# Patient Record
Sex: MALE | Race: WHITE | Employment: FULL TIME | URBAN - METROPOLITAN AREA
[De-identification: names, ages, dates, MRNs, and addresses within clinical notes are randomized per-mention and may not be internally consistent; named-entity substitution may affect disease eponyms.]

---

## 2019-04-28 ENCOUNTER — APPOINTMENT (OUTPATIENT)
Dept: CT IMAGING | Facility: CLINIC | Age: 34
End: 2019-04-28
Attending: EMERGENCY MEDICINE
Payer: COMMERCIAL

## 2019-04-28 ENCOUNTER — HOSPITAL ENCOUNTER (OUTPATIENT)
Facility: CLINIC | Age: 34
Setting detail: OBSERVATION
Discharge: HOME | End: 2019-04-29
Attending: EMERGENCY MEDICINE | Admitting: HOSPITALIST
Payer: COMMERCIAL

## 2019-04-28 DIAGNOSIS — G45.9 TIA (TRANSIENT ISCHEMIC ATTACK): ICD-10-CM

## 2019-04-28 LAB
ANION GAP SERPL CALCULATED.3IONS-SCNC: 5 MMOL/L (ref 3–14)
APTT PPP: 33 SEC (ref 22–37)
BASOPHILS # BLD AUTO: 0.1 10E9/L (ref 0–0.2)
BASOPHILS NFR BLD AUTO: 0.6 %
BUN SERPL-MCNC: 17 MG/DL (ref 7–30)
CALCIUM SERPL-MCNC: 8.5 MG/DL (ref 8.5–10.1)
CHLORIDE SERPL-SCNC: 103 MMOL/L (ref 94–109)
CHOLEST SERPL-MCNC: 157 MG/DL
CO2 SERPL-SCNC: 29 MMOL/L (ref 20–32)
CREAT SERPL-MCNC: 0.7 MG/DL (ref 0.66–1.25)
DIFFERENTIAL METHOD BLD: ABNORMAL
EOSINOPHIL # BLD AUTO: 0.2 10E9/L (ref 0–0.7)
EOSINOPHIL NFR BLD AUTO: 1.9 %
ERYTHROCYTE [DISTWIDTH] IN BLOOD BY AUTOMATED COUNT: 14 % (ref 10–15)
GFR SERPL CREATININE-BSD FRML MDRD: >90 ML/MIN/{1.73_M2}
GLUCOSE BLDC GLUCOMTR-MCNC: 177 MG/DL (ref 70–99)
GLUCOSE SERPL-MCNC: 186 MG/DL (ref 70–99)
HBA1C MFR BLD: 6.3 % (ref 0–5.6)
HCT VFR BLD AUTO: 42.4 % (ref 40–53)
HDLC SERPL-MCNC: 35 MG/DL
HGB BLD-MCNC: 13.6 G/DL (ref 13.3–17.7)
IMM GRANULOCYTES # BLD: 0.1 10E9/L (ref 0–0.4)
IMM GRANULOCYTES NFR BLD: 1 %
INR PPP: 1 (ref 0.86–1.14)
INTERPRETATION ECG - MUSE: NORMAL
LDLC SERPL CALC-MCNC: 69 MG/DL
LYMPHOCYTES # BLD AUTO: 2.3 10E9/L (ref 0.8–5.3)
LYMPHOCYTES NFR BLD AUTO: 20.2 %
MCH RBC QN AUTO: 27.2 PG (ref 26.5–33)
MCHC RBC AUTO-ENTMCNC: 32.1 G/DL (ref 31.5–36.5)
MCV RBC AUTO: 85 FL (ref 78–100)
MONOCYTES # BLD AUTO: 0.6 10E9/L (ref 0–1.3)
MONOCYTES NFR BLD AUTO: 5.4 %
NEUTROPHILS # BLD AUTO: 8 10E9/L (ref 1.6–8.3)
NEUTROPHILS NFR BLD AUTO: 70.9 %
NONHDLC SERPL-MCNC: 120 MG/DL
NRBC # BLD AUTO: 0 10*3/UL
NRBC BLD AUTO-RTO: 0 /100
PLATELET # BLD AUTO: 352 10E9/L (ref 150–450)
POTASSIUM SERPL-SCNC: 4 MMOL/L (ref 3.4–5.3)
RBC # BLD AUTO: 5 10E12/L (ref 4.4–5.9)
SODIUM SERPL-SCNC: 137 MMOL/L (ref 133–144)
TRIGL SERPL-MCNC: 257 MG/DL
TROPONIN I SERPL-MCNC: <0.015 UG/L (ref 0–0.04)
WBC # BLD AUTO: 11.3 10E9/L (ref 4–11)

## 2019-04-28 PROCEDURE — G0508 CRIT CARE TELEHEA CONSULT 60: HCPCS | Mod: G0 | Performed by: PSYCHIATRY & NEUROLOGY

## 2019-04-28 PROCEDURE — 70498 CT ANGIOGRAPHY NECK: CPT

## 2019-04-28 PROCEDURE — 25000132 ZZH RX MED GY IP 250 OP 250 PS 637: Performed by: EMERGENCY MEDICINE

## 2019-04-28 PROCEDURE — G0378 HOSPITAL OBSERVATION PER HR: HCPCS

## 2019-04-28 PROCEDURE — 99285 EMERGENCY DEPT VISIT HI MDM: CPT | Mod: 25

## 2019-04-28 PROCEDURE — 0042T CT HEAD PERFUSION WITH CONTRAST: CPT

## 2019-04-28 PROCEDURE — 00000146 ZZHCL STATISTIC GLUCOSE BY METER IP

## 2019-04-28 PROCEDURE — 83036 HEMOGLOBIN GLYCOSYLATED A1C: CPT | Performed by: EMERGENCY MEDICINE

## 2019-04-28 PROCEDURE — 80048 BASIC METABOLIC PNL TOTAL CA: CPT | Performed by: EMERGENCY MEDICINE

## 2019-04-28 PROCEDURE — 93005 ELECTROCARDIOGRAM TRACING: CPT

## 2019-04-28 PROCEDURE — 85610 PROTHROMBIN TIME: CPT | Performed by: EMERGENCY MEDICINE

## 2019-04-28 PROCEDURE — 99220 ZZC INITIAL OBSERVATION CARE,LEVL III: CPT | Performed by: PHYSICIAN ASSISTANT

## 2019-04-28 PROCEDURE — 85730 THROMBOPLASTIN TIME PARTIAL: CPT | Performed by: EMERGENCY MEDICINE

## 2019-04-28 PROCEDURE — 80061 LIPID PANEL: CPT | Performed by: EMERGENCY MEDICINE

## 2019-04-28 PROCEDURE — 25000128 H RX IP 250 OP 636: Performed by: EMERGENCY MEDICINE

## 2019-04-28 PROCEDURE — 85025 COMPLETE CBC W/AUTO DIFF WBC: CPT | Performed by: EMERGENCY MEDICINE

## 2019-04-28 PROCEDURE — 70450 CT HEAD/BRAIN W/O DYE: CPT

## 2019-04-28 PROCEDURE — 84484 ASSAY OF TROPONIN QUANT: CPT | Performed by: EMERGENCY MEDICINE

## 2019-04-28 RX ORDER — POLYETHYLENE GLYCOL 3350 17 G/17G
17 POWDER, FOR SOLUTION ORAL DAILY PRN
Status: DISCONTINUED | OUTPATIENT
Start: 2019-04-28 | End: 2019-04-30 | Stop reason: HOSPADM

## 2019-04-28 RX ORDER — LAMOTRIGINE 200 MG/1
200 TABLET ORAL AT BEDTIME
Status: DISCONTINUED | OUTPATIENT
Start: 2019-04-28 | End: 2019-04-30 | Stop reason: HOSPADM

## 2019-04-28 RX ORDER — AMOXICILLIN 250 MG
1 CAPSULE ORAL 2 TIMES DAILY PRN
Status: DISCONTINUED | OUTPATIENT
Start: 2019-04-28 | End: 2019-04-30 | Stop reason: HOSPADM

## 2019-04-28 RX ORDER — NALOXONE HYDROCHLORIDE 0.4 MG/ML
.1-.4 INJECTION, SOLUTION INTRAMUSCULAR; INTRAVENOUS; SUBCUTANEOUS
Status: DISCONTINUED | OUTPATIENT
Start: 2019-04-28 | End: 2019-04-30 | Stop reason: HOSPADM

## 2019-04-28 RX ORDER — ACETAMINOPHEN 650 MG/1
650 SUPPOSITORY RECTAL EVERY 4 HOURS PRN
Status: DISCONTINUED | OUTPATIENT
Start: 2019-04-28 | End: 2019-04-30 | Stop reason: HOSPADM

## 2019-04-28 RX ORDER — ACETAMINOPHEN 325 MG/1
650 TABLET ORAL EVERY 4 HOURS PRN
Status: DISCONTINUED | OUTPATIENT
Start: 2019-04-28 | End: 2019-04-30 | Stop reason: HOSPADM

## 2019-04-28 RX ORDER — ATORVASTATIN CALCIUM 40 MG/1
40 TABLET, FILM COATED ORAL
Status: DISCONTINUED | OUTPATIENT
Start: 2019-04-28 | End: 2019-04-30 | Stop reason: HOSPADM

## 2019-04-28 RX ORDER — AMOXICILLIN 250 MG
2 CAPSULE ORAL 2 TIMES DAILY PRN
Status: DISCONTINUED | OUTPATIENT
Start: 2019-04-28 | End: 2019-04-30 | Stop reason: HOSPADM

## 2019-04-28 RX ORDER — ONDANSETRON 2 MG/ML
4 INJECTION INTRAMUSCULAR; INTRAVENOUS EVERY 6 HOURS PRN
Status: DISCONTINUED | OUTPATIENT
Start: 2019-04-28 | End: 2019-04-30 | Stop reason: HOSPADM

## 2019-04-28 RX ORDER — LIDOCAINE 40 MG/G
CREAM TOPICAL
Status: DISCONTINUED | OUTPATIENT
Start: 2019-04-28 | End: 2019-04-30 | Stop reason: HOSPADM

## 2019-04-28 RX ORDER — ESCITALOPRAM OXALATE 20 MG/1
20 TABLET ORAL AT BEDTIME
Status: DISCONTINUED | OUTPATIENT
Start: 2019-04-28 | End: 2019-04-30 | Stop reason: HOSPADM

## 2019-04-28 RX ORDER — ASPIRIN 300 MG/1
300 SUPPOSITORY RECTAL DAILY
Status: DISCONTINUED | OUTPATIENT
Start: 2019-04-29 | End: 2019-04-30 | Stop reason: HOSPADM

## 2019-04-28 RX ORDER — ONDANSETRON 4 MG/1
4 TABLET, ORALLY DISINTEGRATING ORAL EVERY 6 HOURS PRN
Status: DISCONTINUED | OUTPATIENT
Start: 2019-04-28 | End: 2019-04-30 | Stop reason: HOSPADM

## 2019-04-28 RX ORDER — IOPAMIDOL 755 MG/ML
500 INJECTION, SOLUTION INTRAVASCULAR ONCE
Status: COMPLETED | OUTPATIENT
Start: 2019-04-28 | End: 2019-04-28

## 2019-04-28 RX ORDER — ESCITALOPRAM OXALATE 20 MG/1
20 TABLET ORAL AT BEDTIME
COMMUNITY

## 2019-04-28 RX ORDER — CLOPIDOGREL BISULFATE 75 MG/1
300 TABLET ORAL ONCE
Status: COMPLETED | OUTPATIENT
Start: 2019-04-28 | End: 2019-04-28

## 2019-04-28 RX ORDER — LAMOTRIGINE 200 MG/1
200 TABLET ORAL AT BEDTIME
COMMUNITY

## 2019-04-28 RX ORDER — LABETALOL 20 MG/4 ML (5 MG/ML) INTRAVENOUS SYRINGE
10-40 EVERY 10 MIN PRN
Status: DISCONTINUED | OUTPATIENT
Start: 2019-04-28 | End: 2019-04-30 | Stop reason: HOSPADM

## 2019-04-28 RX ORDER — CLOPIDOGREL BISULFATE 75 MG/1
75 TABLET ORAL DAILY
Status: DISCONTINUED | OUTPATIENT
Start: 2019-04-29 | End: 2019-04-30 | Stop reason: HOSPADM

## 2019-04-28 RX ORDER — ASPIRIN 325 MG
325 TABLET ORAL ONCE
Status: COMPLETED | OUTPATIENT
Start: 2019-04-28 | End: 2019-04-28

## 2019-04-28 RX ADMIN — IOPAMIDOL 120 ML: 755 INJECTION, SOLUTION INTRAVENOUS at 19:45

## 2019-04-28 RX ADMIN — ASPIRIN 325 MG ORAL TABLET 325 MG: 325 PILL ORAL at 20:14

## 2019-04-28 RX ADMIN — CLOPIDOGREL BISULFATE 300 MG: 75 TABLET, FILM COATED ORAL at 20:14

## 2019-04-28 ASSESSMENT — ENCOUNTER SYMPTOMS
LIGHT-HEADEDNESS: 1
NUMBNESS: 1

## 2019-04-28 ASSESSMENT — MIFFLIN-ST. JEOR: SCORE: 3003

## 2019-04-29 ENCOUNTER — APPOINTMENT (OUTPATIENT)
Dept: CARDIOLOGY | Facility: CLINIC | Age: 34
End: 2019-04-29
Attending: PHYSICIAN ASSISTANT
Payer: COMMERCIAL

## 2019-04-29 ENCOUNTER — APPOINTMENT (OUTPATIENT)
Dept: MRI IMAGING | Facility: CLINIC | Age: 34
End: 2019-04-29
Attending: PHYSICIAN ASSISTANT
Payer: COMMERCIAL

## 2019-04-29 VITALS
RESPIRATION RATE: 18 BRPM | OXYGEN SATURATION: 98 % | WEIGHT: 315 LBS | TEMPERATURE: 97.9 F | HEIGHT: 72 IN | BODY MASS INDEX: 42.66 KG/M2 | HEART RATE: 78 BPM | DIASTOLIC BLOOD PRESSURE: 84 MMHG | SYSTOLIC BLOOD PRESSURE: 137 MMHG

## 2019-04-29 LAB
ANION GAP SERPL CALCULATED.3IONS-SCNC: 4 MMOL/L (ref 3–14)
BUN SERPL-MCNC: 13 MG/DL (ref 7–30)
CALCIUM SERPL-MCNC: 8.4 MG/DL (ref 8.5–10.1)
CHLORIDE SERPL-SCNC: 107 MMOL/L (ref 94–109)
CO2 SERPL-SCNC: 26 MMOL/L (ref 20–32)
CREAT SERPL-MCNC: 0.66 MG/DL (ref 0.66–1.25)
ERYTHROCYTE [DISTWIDTH] IN BLOOD BY AUTOMATED COUNT: 14.3 % (ref 10–15)
GFR SERPL CREATININE-BSD FRML MDRD: >90 ML/MIN/{1.73_M2}
GLUCOSE SERPL-MCNC: 105 MG/DL (ref 70–99)
HCT VFR BLD AUTO: 42.4 % (ref 40–53)
HGB BLD-MCNC: 13.4 G/DL (ref 13.3–17.7)
MCH RBC QN AUTO: 26.6 PG (ref 26.5–33)
MCHC RBC AUTO-ENTMCNC: 31.6 G/DL (ref 31.5–36.5)
MCV RBC AUTO: 84 FL (ref 78–100)
PLATELET # BLD AUTO: 330 10E9/L (ref 150–450)
POTASSIUM SERPL-SCNC: 4.3 MMOL/L (ref 3.4–5.3)
RBC # BLD AUTO: 5.04 10E12/L (ref 4.4–5.9)
SODIUM SERPL-SCNC: 137 MMOL/L (ref 133–144)
WBC # BLD AUTO: 8.9 10E9/L (ref 4–11)

## 2019-04-29 PROCEDURE — 85027 COMPLETE CBC AUTOMATED: CPT | Performed by: PHYSICIAN ASSISTANT

## 2019-04-29 PROCEDURE — 40000264 ECHOCARDIOGRAM COMPLETE

## 2019-04-29 PROCEDURE — 25000132 ZZH RX MED GY IP 250 OP 250 PS 637: Performed by: PHYSICIAN ASSISTANT

## 2019-04-29 PROCEDURE — 36415 COLL VENOUS BLD VENIPUNCTURE: CPT | Performed by: PHYSICIAN ASSISTANT

## 2019-04-29 PROCEDURE — 86146 BETA-2 GLYCOPROTEIN ANTIBODY: CPT | Performed by: PHYSICIAN ASSISTANT

## 2019-04-29 PROCEDURE — 25800025 ZZH RX 258: Performed by: HOSPITALIST

## 2019-04-29 PROCEDURE — 80048 BASIC METABOLIC PNL TOTAL CA: CPT | Performed by: PHYSICIAN ASSISTANT

## 2019-04-29 PROCEDURE — 86147 CARDIOLIPIN ANTIBODY EA IG: CPT | Performed by: PHYSICIAN ASSISTANT

## 2019-04-29 PROCEDURE — 93270 REMOTE 30 DAY ECG REV/REPORT: CPT

## 2019-04-29 PROCEDURE — G0378 HOSPITAL OBSERVATION PER HR: HCPCS

## 2019-04-29 PROCEDURE — 93306 TTE W/DOPPLER COMPLETE: CPT | Mod: 26 | Performed by: INTERNAL MEDICINE

## 2019-04-29 PROCEDURE — 99217 ZZC OBSERVATION CARE DISCHARGE: CPT | Performed by: PHYSICIAN ASSISTANT

## 2019-04-29 PROCEDURE — 85730 THROMBOPLASTIN TIME PARTIAL: CPT | Performed by: PHYSICIAN ASSISTANT

## 2019-04-29 PROCEDURE — 93272 ECG/REVIEW INTERPRET ONLY: CPT | Performed by: INTERNAL MEDICINE

## 2019-04-29 PROCEDURE — 85613 RUSSELL VIPER VENOM DILUTED: CPT | Performed by: PHYSICIAN ASSISTANT

## 2019-04-29 PROCEDURE — 70551 MRI BRAIN STEM W/O DYE: CPT

## 2019-04-29 PROCEDURE — 00000401 ZZHCL STATISTIC THROMBIN TIME NC: Performed by: PHYSICIAN ASSISTANT

## 2019-04-29 PROCEDURE — 00000167 ZZHCL STATISTIC INR NC: Performed by: PHYSICIAN ASSISTANT

## 2019-04-29 PROCEDURE — 25500064 ZZH RX 255 OP 636: Performed by: HOSPITALIST

## 2019-04-29 RX ORDER — ASPIRIN 325 MG
325 TABLET, DELAYED RELEASE (ENTERIC COATED) ORAL DAILY
Qty: 30 TABLET | Refills: 0 | Status: SHIPPED | OUTPATIENT
Start: 2019-04-30

## 2019-04-29 RX ORDER — ACYCLOVIR 200 MG/1
30 CAPSULE ORAL ONCE
Status: COMPLETED | OUTPATIENT
Start: 2019-04-29 | End: 2019-04-29

## 2019-04-29 RX ORDER — GADOBUTROL 604.72 MG/ML
15 INJECTION INTRAVENOUS ONCE
Status: DISCONTINUED | OUTPATIENT
Start: 2019-04-29 | End: 2019-04-29

## 2019-04-29 RX ORDER — ATORVASTATIN CALCIUM 40 MG/1
40 TABLET, FILM COATED ORAL DAILY
Qty: 30 TABLET | Refills: 0 | Status: SHIPPED | OUTPATIENT
Start: 2019-04-29

## 2019-04-29 RX ORDER — CLOPIDOGREL BISULFATE 75 MG/1
75 TABLET ORAL DAILY
Qty: 20 TABLET | Refills: 0 | Status: SHIPPED | OUTPATIENT
Start: 2019-04-30

## 2019-04-29 RX ADMIN — LAMOTRIGINE 200 MG: 200 TABLET ORAL at 00:16

## 2019-04-29 RX ADMIN — CLOPIDOGREL BISULFATE 75 MG: 75 TABLET ORAL at 08:36

## 2019-04-29 RX ADMIN — HUMAN ALBUMIN MICROSPHERES AND PERFLUTREN 3 ML: 10; .22 INJECTION, SOLUTION INTRAVENOUS at 08:27

## 2019-04-29 RX ADMIN — ASPIRIN 325 MG: 325 TABLET, DELAYED RELEASE ORAL at 08:36

## 2019-04-29 RX ADMIN — SODIUM CHLORIDE 20 ML: 9 INJECTION, SOLUTION INTRAMUSCULAR; INTRAVENOUS; SUBCUTANEOUS at 08:27

## 2019-04-29 RX ADMIN — ESCITALOPRAM OXALATE 20 MG: 20 TABLET ORAL at 00:16

## 2019-04-29 RX ADMIN — ATORVASTATIN CALCIUM 40 MG: 40 TABLET, FILM COATED ORAL at 00:16

## 2019-04-29 NOTE — ED TRIAGE NOTES
Patient comes in for evaluation of right sided weakness which started 2 hours PTA. Also notes started to become dizzy as well. ABCs intact.

## 2019-04-29 NOTE — CONSULTS
"[facility]      Stroke Consult Note    Reason for Consult:  Stroke Code    HPI  Jareth Bryant is a 33 year old male with morbid obesity, bipolar disorder, recovered alcoholic who presents with hemiplegia.  He notes that he developed right arm numbness primarily from his armpit extending to his fingers.  This gradually became \"heaviness\" and he had noted  weakness in the ED.  He also notes that when he tried to walk his right leg gave out on him at times.  He is now almost entirely back to normal.  CT/CTA/CTP unrevealing.  CTA limited study.    No history of migraines or neck pain.  He denies smoking, drugs, or alcohol.    TPA Treatment   Not given due to minor / isolated / quickly resolving stroke symptoms.    Endovascular Treatment  Not initiated due to absence of proximal vessel occlusion    Impression  probable TIA    Recommendations  1. Aspirin 325mg  2. Plavix 300mg  3. Admit for stroke work-up including MRI if patient not too heavy  4. PT/OT/Speech  5. PLC stroke learning consult  6. Telestroke Monitoring consult  7. If work-up does not reveal alternative etiology, patient will need antiphospholipid, anticardiolipin, lupus anticoagulant, and beta-2glycoprotein testing for hypercoagulable state with consideration of entire hypercoagulable panel.    Patient Follow-up    - final recommendation pending work-up      Please contact the Stroke Service with any questions.    Juventino Nair MD, MS  Vascular Neurology  April 28, 2019    Text Page ([id number])  __________________________________________________    No past medical history on file.    No past surgical history on file.    Medications   No current facility-administered medications for this encounter.      No current outpatient medications on file.         (Not in a hospital admission)    Allergies   Allergies   Allergen Reactions     Penicillins      Sulfa Drugs        Family History       Social History   Social History "     Socioeconomic History     Marital status: Not on file     Spouse name: Not on file     Number of children: Not on file     Years of education: Not on file     Highest education level: Not on file   Occupational History     Not on file   Social Needs     Financial resource strain: Not on file     Food insecurity:     Worry: Not on file     Inability: Not on file     Transportation needs:     Medical: Not on file     Non-medical: Not on file   Tobacco Use     Smoking status: Not on file   Substance and Sexual Activity     Alcohol use: Not on file     Drug use: Not on file     Sexual activity: Not on file   Lifestyle     Physical activity:     Days per week: Not on file     Minutes per session: Not on file     Stress: Not on file   Relationships     Social connections:     Talks on phone: Not on file     Gets together: Not on file     Attends Mandaen service: Not on file     Active member of club or organization: Not on file     Attends meetings of clubs or organizations: Not on file     Relationship status: Not on file     Intimate partner violence:     Fear of current or ex partner: Not on file     Emotionally abused: Not on file     Physically abused: Not on file     Forced sexual activity: Not on file   Other Topics Concern     Not on file   Social History Narrative     Not on file          ROS  The 10 point Review of Systems is negative other than noted in the HPI or here.     PHYSICAL EXAMINATION  B/P:     160/88 (04/28/19 1922)         Pulse: 78 (04/28/19 1922)   Resp:  20 (04/28/19 1922)  Temp:          Neuro:  Mental status: Awake, alert, attentive, oriented x3. Speech is fluent, comprehension and repetition intact. No dysarthria.  Good historian.  No neglect.  Cranial nerves: EOMI, visual fields full, face symmetric, facial sensation intact, shoulder shrug strong, tongue/uvula midline, hearing intact to conversation.  Motor: 5/5 strength in all 4 extremities without drift.  Subjective heaviness of R.  Arm still present  Sensory (assessed via nursing): Intact to light touch in face, arms, and legs  Coordination: Finger to nose intact bilaterally without dysmetria.  Normal heel-shin test bilaterally  Gait: Deferred       Stroke Scales    NIHSS  Interval and Comments baseline   1a. Level of Consciousness 0-->Alert, keenly responsive   1b. LOC Questions 0-->Answers both questions correctly   1c. LOC Commands 0-->Performs both tasks correctly   2.   Best Gaze 0-->Normal   3.   Visual 0-->No visual loss   4.   Facial Palsy 0-->Normal symmetrical movements   5a. Motor Arm, Left 0-->No drift, limb holds 90 (or 45) degrees for full 10 secs   5b. Motor Arm, Right 0-->No drift, limb holds 90 (or 45) degrees for full 10 secs   6a. Motor Leg, Left 0-->No drift, leg holds 30 degree position for full 5 secs   6b. Motor Leg, right 0-->No drift, leg holds 30 degree position for full 5 secs   7.   Limb Ataxia 0-->Absent   8.   Sensory 0-->Normal, no sensory loss   9.   Best Language 0-->No aphasia, normal   10. Dysarthria 0-->Normal   11. Extinction and Inattention  0-->No abnormality   Total 0       Labs/Imaging  Labs and imaging were reviewed and used in developing plan; pertinent results included.  Data   CBC  WBC (10e9/L)   Date Value   04/28/2019 11.3 (H)    RBC Count (10e12/L)   Date Value   04/28/2019 5.00    Hemoglobin (g/dL)   Date Value   04/28/2019 13.6      Hematocrit (%)   Date Value   04/28/2019 42.4    Platelet Count (10e9/L)   Date Value   04/28/2019 352         BMP  Sodium (mmol/L)   Date Value   04/28/2019 137    Potassium (mmol/L)   Date Value   04/28/2019 4.0    Chloride (mmol/L)   Date Value   04/28/2019 103      Carbon Dioxide (mmol/L)   Date Value   04/28/2019 29    Glucose (mg/dL)   Date Value   04/28/2019 186 (H)    Urea Nitrogen (mg/dL)   Date Value   04/28/2019 17      Creatinine (mg/dL)   Date Value   04/28/2019 0.70    Calcium (mg/dL)   Date Value   04/28/2019 8.5         INR Troponin I A1C   INR (no  units)   Date Value   04/28/2019 1.00    Troponin I ES (ug/L)   Date Value   04/28/2019 <0.015    No results found for: A1C     Liver Panel  No results found for: PROTTOTAL No results found for: ALBUMIN No results found for: BILITOTAL   No results found for: ALKPHOS No results found for: AST No results found for: ALT   No results found for: BILIDIRECT       Lipid Profile  No results found for: CHOL No results found for: HDL No results found for: LDL   No results found for: TRIG No results found for: CHOLHDLRATIO           I have personally spent a total of 60 minutes providing critical care services supervising this patient's stroke code activation.  I personally reviewed all lab values and radiology images.  I evaluated and directed care for the patient's critical condition.     Stroke Code Data  (for stroke code with tele)  Stroke code activated 04/28/19   1929   First stroke provider response 04/28/19   1930   Tele service began 04/28/19 1950   Tele service ended 04/28/19 2003   Last known normal 04/28/19   1630   Time of discovery  (or onset of symptoms)  04/28/19   1630   Head CT read by me 04/28/19   1940   Was stroke code de-escalated? Yes 04/28/19 2009  presence of contraindications for both intravenous and intra-arterial stroke treatments        Telestroke Service Details  Type of service telemedicine diagnostic assessment of acute neurological changes   Reason telemedicine is appropriate patient requires assessment with a specialist for diagnosis and treatment of neurological symptoms   Mode of transmission secure interactive audio and video communication per [employer]   Originating site (patient location) [facility]    Distant site (provider location) [facility]

## 2019-04-29 NOTE — PLAN OF CARE
PRIMARY DIAGNOSIS: TIA  OUTPATIENT/OBSERVATION GOALS TO BE MET BEFORE DISCHARGE:  1. Orthostatic performed: N/A    2. Diagnostic testing complete & at baseline neurologic testing: No, Pt to have MRI and ECHO bubble in AM.      3. Cleared by consultants (if involved): No    4. Interpretation of cardiac rhythm per telemetry tech: SR with HR of 77    5. Tolerating adequate PO diet and medications: Yes    6. Return to near baseline physical activity or neurologic status: Yes    Discharge Planner Nurse   Safe discharge environment identified: Yes  Barriers to discharge: No       Entered by: Triston Fleming 04/29/2019 3:59 AM     Please review provider order for any additional goals.   Nurse to notify provider when observation goals have been met and patient is ready for discharge.  Temp: 98.1  F (36.7  C) Temp src: Oral BP: 151/88 Pulse: 78 Heart Rate: 83 Resp: 16 SpO2: 98 % O2 Device: None (Room air)      Pt A&Ox4. Pt denies pain or weakness. LS clear bilaterally across all fields. Bowel sounds present in all quadrants. Pt strength equal bilaterally in both upper and lower extremities. Neuro WDL. Pt to have ECHO bubble and MRI in AM.

## 2019-04-29 NOTE — PROGRESS NOTES
Patient's demographics show no insurance. CM contacted Financial Counselor's office (*[id number]) to request follow up with patient re: insurance.     Jes Dobbs MA-RN  Care Transition Services  [phone]

## 2019-04-29 NOTE — PLAN OF CARE
Improving.     PRIMARY DIAGNOSIS: TIA  OUTPATIENT/OBSERVATION GOALS TO BE MET BEFORE DISCHARGE:  1. Orthostatic performed: No     2. Diagnostic testing complete & at baseline neurologic testing: No     3. Cleared by consultants (if involved): No     4. Interpretation of cardiac rhythm per telemetry tech: SR HR 70's     5. Tolerating adequate PO diet and medications: Yes     6. Return to near baseline physical activity or neurologic status: Yes     Discharge Planner Nurse   Safe discharge environment identified: Yes  Barriers to discharge: No       Entered by: Anastacio Hernández 04/29/2019      Pt alert and oriented x4. He denies pain. Neuros intact. Independent in room. Saline locked. Tele SR HR 70's. Echo this AM. Pending MRI, then hopeful discharge.   /75 (BP Location: Right arm)   Pulse 78   Temp 97.9  F (36.6  C) (Oral)   Resp 20   Ht 1.829 m (6')   Wt (!) 201 kg (443 lb 2 oz)   SpO2 98%   BMI 60.10 kg/m            Please review provider order for any additional goals.   Nurse to notify provider when observation goals have been met and patient is ready for discharge.

## 2019-04-29 NOTE — ED PROVIDER NOTES
"  History     Chief Complaint:  One-sided Weakness    The history is provided by the patient.      Jareth Bryant is a 33 year old otherwise healthy male who presents with his spouse for evaluation of one sided weakness that began three hours prior to arrival. Of note, patient has not had a physical in over 10 years and has never been tested for diabetes, high cholesterol or hypertension, noting \"the only thing I know is I'm overweight\". Patient reports that he initially felt a \"heaviness\" in his right arm, going from the shoulder into his fingertips. He denies any prior incident of this. Patient reported that while driving to his in-laws, as he was getting out of the car, his right leg \"gave out\". Concerned, spouse prompted patient to present.     Here in the ED, patient endorses continued heaviness in his arm with associated numbness and tingling as though \"someone put a weight on my arm\". Patient endorsed that this sensation is different than the left arm. He also notes some lightheadedness. He reports that when he arrived here, he no longer had any gait problem like previously. Of note, patient's father has history of TIAs and 2 CVAs and now has dementia in his late 60's.     Allergies:  Penicillins  Sulfa drugs     Medications:    The patient is not currently taking any prescribed medications.     Past Medical History:    History reviewed. No pertinent past medical history.    Past Surgical History:    Oral surgery    Family History:    Father - cardiac issues, TIA, stroke    Social History:  The patient was accompanied to the ED by spouse.  Smoking Status: N/A  Smokeless Tobacco: N/A  Alcohol Use: N/A  Drug Use: N/A   Marital Status:       Review of Systems   Musculoskeletal: Positive for gait problem.   Neurological: Positive for light-headedness and numbness (tingling and \"heaviness\" in right arm/leg).   All other systems reviewed and are negative.    Physical Exam   Vitals:  Patient Vitals for the " past 24 hrs:   BP Temp Temp src Pulse Heart Rate Resp SpO2 Height Weight   04/28/19 2155 151/88 98.1  F (36.7  C) Oral 78 -- 16 98 % 1.829 m (6') (!) 201 kg (443 lb 2 oz)   04/28/19 2037 -- -- -- -- 83 13 99 % -- --   04/28/19 2035 -- -- -- -- 82 13 99 % -- --   04/28/19 2030 163/84 -- -- 83 -- -- -- -- --   04/28/19 2025 -- 98.3  F (36.8  C) Oral -- -- -- -- -- --   04/28/19 2015 (!) 130/92 -- -- 109 -- -- -- -- --   04/28/19 2000 180/89 -- -- 99 -- -- -- -- --   04/28/19 1956 -- -- -- -- -- -- 99 % -- --   04/28/19 1931 -- -- -- -- -- -- -- -- (!) 202 kg (445 lb 5.3 oz)   04/28/19 1922 160/88 -- -- 78 -- 20 98 % -- --      Physical Exam  Constitutional: Vital signs reviewed as above.   HENT:               Head: No external signs of trauma noted.              Eyes: Pupils are equal, round, and reactive to light.  Cardiovascular: Normal rate, regular rhythm, normal heart sounds and intact distal pulses.    Pulmonary/Chest: Effort normal and breath sounds normal. No respiratory distress. No wheezes noted.   Gastrointestinal: Soft. There is no tenderness. There is no rebound.   Musculoskeletal:              No deformities appreciated              No edema noted  Neurological:               Patient is alert and oriented to person, place, and time.               Speech is fluent, cognition is normal.              CN 2-12 intact (PERRL, EOMI, symmetric smile, equal eye squeeze and forehead raise, normal and equal sensation to bilateral forehead/cheek/chin, equal hearing to finger rub, midline tongue protrusion with nl side-to-side movement, normal shoulder shrug).               RUE strength 4/5: , finger abd, wrist flex/ext, elbow flex/ext.               LUE strength 5/5: , finger abd, wrist flex/ext, elbow flex/ext.               RLE strength 5/5: ankle flex/ext, knee flex/ext, hip flex.               LLE strength 5/5: ankle flex/ext, knee flex/ext, hip flex.               Sensation equal in all 4 extremities.                No arm drift.                Cerebellar: Normal rapid alternating movements                           ( finger-nose-finger, rapid pronation/supination, hand rolling)                          Normal heel-to-shin              Normal gait as tested in the ED.   Skin: Skin is warm and dry.   Psychiatric: The patient appears calm    Emergency Department Course     ECG:  ECG taken at 2033, ECG read at 2040 by Dr. Blaine Guerra, DO  Normal sinus rhythm  Normal ECG  Rate 81 bpm. MN interval 158. QRS duration 90. QT/QTc 382/443. P-R-T axes 50 33 30.     Imaging:  Radiology findings were communicated with the patient and family who voiced understanding of the findings.  CT Head Perfusion w Contrast:   IMPRESSION: Negative CT brain perfusion imaging.   Reading per radiology.     CT Head w/o Contrast:  IMPRESSION: Normal CT scan of the head.  Reading per radiology.     CTA Head Neck with Contrast:  IMPRESSION: No definite abnormalities in the vessels of the head and  neck. Exam slightly limited due to patient's body habitus and timing  of bolus contrast injection.   Reading per radiology.     Laboratory:  Laboratory findings were communicated with the patient and family who voiced understanding of the findings.  CBC: WBC 11.3 (H) o/w WNL (HGB 13.6, )  BMP: Glucose 186 (H) o/w WNL (Creatinine 0.70)  INR: 1.00  PTT: 33  Troponin (Collected 1929): <0.015  Glucose by Meter (Collected 1930): 177 (H)     Interventions:  2014 Aspirin 325 mg PO  2014 Plavix 300 mg PO     Emergency Department Course:  Nursing notes and vitals reviewed.  IV was inserted and blood was drawn for laboratory testing, results above.  The patient was sent for a CT Head Perfusion w Contrast, CT Head w/o Contrast, CTA Head Neck with Contrast while in the emergency department, results above.    EKG obtained in the ED, see results above.      7:25 PM: Patient arrival. Provider in room.     7:25 PM: I performed an exam, including neuro exam, of  the patient as documented above. History obtained from patient.    7:27 PM: Code stroke called.      7:30 PM: .     7:33 PM: I spoke with Dr. Nair of the Stroke Neurpology service regarding patient's presentation, findings, and plan of care.     8:33 PM: I spoke with Dr. Wooten of Radiology service regarding patient's presentation, findings, and plan of care.     8:41 PM: Rechecked patient. Updated regarding results. Discussed plan of care and patient is agreeable to admission.     8:48 PM: I spoke with Shanell Jara PA-C on behalf of Dr. Kilpatrick of the Hospitalist service regarding patient's presentation, findings, and plan of care.     8:59 PM: Updated patient/spouse.    9:06 PM: I spoke with Dr. Wooten of the Radiology service regarding patient's presentation, findings, and plan of care.     I discussed the treatment plan with the patient. They expressed understanding of this plan and consented to admission. I discussed the patient with Shanell Jara PA-C on behalf of Dr. Kilpatrick, who will admit the patient to a monitored bed for further evaluation and treatment.     I personally reviewed the laboratory results with the Patient and spouse and answered all related questions prior to admission.    Impression & Plan      The patient has stroke symptoms:           ED Stroke specific documentation           NIHSS PDF          Protocol PDF     Patient last known well time: about 430 PM  ED Provider first to bedside at: 1924  CT Results received at: 1941    tPA:   Not given due to minor / isolated / quickly resolving stroke symptoms.    If treating with tPA: Ensure SBP<185 and DBP<105 prior to treatment with IV tPA.  Administering IV tPA after treatment with IV labetalol, hydralazine, or nicardipine is reasonable once BP control is established.    Endovascular Retrieval:  Not initiated due to absence of proximal vessel occlusion    National Institutes of Health Stroke Scale (Baseline)  Time Performed: 1924      Score     Level of consciousness: (0)   Alert, keenly responsive    LOC questions: (0)   Answers both questions correctly    LOC commands: (0)   Performs both tasks correctly    Best gaze: (0)   Normal    Visual: (0)   No visual loss    Facial palsy: (0)   Normal symmetrical movements    Motor arm (left): (0)   No drift    Motor arm (right): (0)   No drift    Motor leg (left): (0)   No drift    Motor leg (right): (0)   No drift    Limb ataxia: (0)   Absent    Sensory: (0)   Normal- no sensory loss    Best language: (0)   Normal- no aphasia    Dysarthria: (0)   Normal    Extinction and inattention: (0)   No abnormality        Total Score:  0        Stroke Mimics were considered (including migraine headache, seizure disorder, hypoglycemia (or hyperglycemia), head or spinal trauma, CNS infection, Toxin ingestion and shock state (e.g. sepsis) .    Medical Decision Making:  This 33-year-old male patient presents the ED due to concerns for right upper extremity weakness.  Please see the HPI and exam for specifics.  Fortunately, the patient improved rapidly in the ED.  Given the symptoms he likely had a TIA.  The recommendation from neurology is for aspirin and Plavix as well as observation for further work-up including echocardiogram and consideration for MRI.  I discussed case with the hospitalist who accepts the patient for further monitoring and care.    Diagnosis:    ICD-10-CM    1. TIA (transient ischemic attack) G45.9 Lipid panel reflex to direct LDL     Lipid panel reflex to direct LDL     Hemoglobin A1c     Hemoglobin A1c     CANCELED: Hemoglobin A1c     CANCELED: Lipid panel reflex to direct LDL        Disposition:   Admission.    Scribe Disclosure:  SILVER, Marcy Walters, am serving as a scribe at 7:24 PM on 4/28/2019 to document services personally performed by Blaine Guerra DO, based on my observations and the provider's statements to me.  4/28/2019   [facility] EMERGENCY DEPARTMENT       Charlie  Blaine Faye, DO  04/29/19 0053       Blaine Guerra, DO  04/29/19 0103

## 2019-04-29 NOTE — CONSULTS
Discharge Planner   Discharge Plans in progress: Yes.   Barriers to discharge plan: Patient does not currently have a PCP. Met with patient who states has not established care at a clinic, only going to [facility] for Urgent care when needed. Patient will discuss clinic options with wife and will make own appointment as recommended.   Follow up plan: CM available if patient needs further assistance with establishing care with PCP.        Entered by: Jes Dobbs 04/29/2019 9:35 AM

## 2019-04-29 NOTE — PLAN OF CARE
PRIMARY DIAGNOSIS: TIA  OUTPATIENT/OBSERVATION GOALS TO BE MET BEFORE DISCHARGE:  1. Orthostatic performed: N/A    2. Diagnostic testing complete & at baseline neurologic testing: No, Pt to have MRI and ECHO bubble in AM.      3. Cleared by consultants (if involved): No    4. Interpretation of cardiac rhythm per telemetry tech: SR with HR of 77    5. Tolerating adequate PO diet and medications: Yes    6. Return to near baseline physical activity or neurologic status: Yes    Discharge Planner Nurse   Safe discharge environment identified: Yes  Barriers to discharge: No       Entered by: Triston Fleming 04/29/2019 4:23 AM     Please review provider order for any additional goals.   Nurse to notify provider when observation goals have been met and patient is ready for discharge.  Temp: 98.1  F (36.7  C) Temp src: Oral BP: 151/88 Pulse: 78 Heart Rate: 83 Resp: 16 SpO2: 98 % O2 Device: None (Room air)      Pt A&Ox4. Pt denies pain or weakness. LS clear bilaterally across all fields. Bowel sounds present in all quadrants. Pt strength equal bilaterally in both upper and lower extremities. Neuro WDL. Pt to have ECHO bubble and MRI in AM. Pt is currently sleeping

## 2019-04-29 NOTE — PLAN OF CARE
PRIMARY DIAGNOSIS: TIA  OUTPATIENT/OBSERVATION GOALS TO BE MET BEFORE DISCHARGE:  1. Orthostatic performed: No     2. Diagnostic testing complete & at baseline neurologic testing: No     3. Cleared by consultants (if involved): No     4. Interpretation of cardiac rhythm per telemetry tech: SR HR 70's     5. Tolerating adequate PO diet and medications: Yes     6. Return to near baseline physical activity or neurologic status: Yes     Discharge Planner Nurse   Safe discharge environment identified: Yes  Barriers to discharge: No       Entered by: Anastacio Hernández 04/29/2019      Pt alert and oriented x4. He denies pain. Neuros intact. Independent in room. Saline locked. Tele SR HR 70's. Echo this AM. Neurology consult. MRI this afternoon. Hope to go home this afternoon.      /75 (BP Location: Right arm)   Pulse 78   Temp 98.2  F (36.8  C) (Oral)   Resp 20   Ht 1.829 m (6')   Wt (!) 201 kg (443 lb 2 oz)   SpO2 98%   BMI 60.10 kg/m         Please review provider order for any additional goals.   Nurse to notify provider when observation goals have been met and patient is ready for discharge.

## 2019-04-29 NOTE — PROGRESS NOTES
[facility]  Observation Unit  Progress Note    Date of Service: 4/29/2019    Patient: Jareth Bryant  MRN: [id number]  Admission Date: 4/28/2019  Hospital Day # 2    Assessment & Plan: Jareth Bryant is a 33 year old male with a history of Obesity, Bipolar Disorder who presented to the ED on 4/28/19 with acute onset of RUE numbness and weakness and RLE weakness.  Patient has remained asymptomatic overnight.    Right Arm and Leg weakness/numbness likely related to TIA vs CVA - Patient described the onset of right arm numbness/tingling from armpit to fingertips that progressed to arm weakness and then to leg weakness at approximately 6 PM on 4/28/19.  Symptoms resolved at the time of admission and he has remained asymptomatic.    Work up thus far:  Pt hemodynamically stable.  CT head and neck negative.  Echo bubble wnl.  EKG with NSR.  Troponin negative.  Telemetry with NSR.  Lipid panel with elevated triglycerides 257 o/w wnl.  - Continue Plavix 75 mg and Aspirin 325mg daily together for 21 days; then aspirin 325mg alone  - MRI brain pending  - Stroke Neurology consulted  - If MRI does not show an obvious alternative cause such as brain mass, patient will need antiphospholipid, anticardiolipin, lupus anticoagulant, and beta-2glycoprotein testing for hypercoagulable state with consideration of entire hypercoagulable panel with follow up with PCP.  - 30 day event monitor             Pre-Diabetes Mellitus Type 2 - hgb A1C 6.3.  Will provide patient with diabetes education materials and will need close follow up monitoring with PCP.      CODE: Full  DVT: ambulation  Diet/fluids: regular diet  Disposition: possible discharge home later today pending MRI results.    Carmel Strong MS, PA-C  Hospitalist Physician Assistant  [facility]  Pager: [phone]      Subjective & Interval Hx:    Patient reports he feels back to his baseline.  Denies any numbness, weakness, chest pain, shortness of  breath.  He tried to go in the MRI machine earlier, but it was a tight fit and he did not think he could tolerate being in there for 30 minutes.      Last 24 hr care team notes reviewed.   ROS:  4 point ROS including Respiratory, CV, GI and , other than that noted in the HPI, is negative.    Physical Exam:    Blood pressure 149/75, pulse 78, temperature 98.2  F (36.8  C), temperature source Oral, resp. rate 20, height 1.829 m (6'), weight (!) 201 kg (443 lb 2 oz), SpO2 98 %. on room air  General: Alert, interactive, NAD, sitting up in bed with family at the bedside, pleasant and cooperative.  HEENT: AT/NC, sclera anicteric, PERRL, EOMI  Resp: clear to auscultation bilaterally, no crackles or wheezes  Cardiac: regular rate and rhythm, no murmur  Abdomen: Soft, nontender, nondistended. +BS.  No HSM or masses, no rebound or guarding.  Extremities: No LE edema  Skin: Warm and dry, no jaundice or rash  Neuro: Alert & oriented x 3, Cns 2-12 intact, moves all extremities equally    Labs & Images:  Reviewed in Epic   Medications:    Current Facility-Administered Medications   Medication     acetaminophen (TYLENOL) Suppository 650 mg     acetaminophen (TYLENOL) tablet 650 mg     aspirin (ASA) EC tablet 325 mg    Or     aspirin (ASA) Suppository 300 mg     atorvastatin (LIPITOR) tablet 40 mg     clopidogrel (PLAVIX) tablet 75 mg     escitalopram (LEXAPRO) tablet 20 mg     labetalol (NORMODYNE/TRANDATE) syringe 10-40 mg     lamoTRIgine (LaMICtal) tablet 200 mg     lidocaine (LMX4) cream     lidocaine 1 % 0.1-1 mL     melatonin tablet 1 mg     naloxone (NARCAN) injection 0.1-0.4 mg     ondansetron (ZOFRAN-ODT) ODT tab 4 mg    Or     ondansetron (ZOFRAN) injection 4 mg     polyethylene glycol (MIRALAX/GLYCOLAX) Packet 17 g     senna-docusate (SENOKOT-S/PERICOLACE) 8.6-50 MG per tablet 1 tablet    Or     senna-docusate (SENOKOT-S/PERICOLACE) 8.6-50 MG per tablet 2 tablet     sodium chloride (PF) 0.9% PF flush 3 mL     sodium  chloride (PF) 0.9% PF flush 3 mL

## 2019-04-29 NOTE — PLAN OF CARE
Improving.     PRIMARY DIAGNOSIS: Possible TIA  OUTPATIENT/OBSERVATION GOALS TO BE MET BEFORE DISCHARGE:  1. Orthostatic performed: N/A    2. Diagnostic testing complete & at baseline neurologic testing: No, Patient still needs MRI and ECHO bubble in AM. Patient states teleStroke was done in the ED.     3. Cleared by consultants (if involved): No    4. Interpretation of cardiac rhythm per telemetry tech: NSR.     5. Tolerating adequate PO diet and medications: Yes    6. Return to near baseline physical activity or neurologic status: Yes, patient states that his arm no longer feels heavy, denies numbness and/or tingling.     Discharge Planner Nurse   Safe discharge environment identified: Yes  Barriers to discharge: No       Entered by: Marsha Ortiz 04/28/2019 11:01 PM     Please review provider order for any additional goals.   Nurse to notify provider when observation goals have been met and patient is ready for discharge.

## 2019-04-29 NOTE — PLAN OF CARE
ROOM # 232    Living Situation (if not independent, order SW consult): Home with family   Facility name:  : wife     Activity level at baseline: IND  Activity level on admit: IND      Patient registered to observation; given Patient Bill of Rights; given the opportunity to ask questions about observation status and their plan of care.  Patient has been oriented to the observation room, bathroom and call light is in place.    Discussed discharge goals and expectations with patient/family.

## 2019-04-29 NOTE — PLAN OF CARE
PRIMARY DIAGNOSIS: TIA  OUTPATIENT/OBSERVATION GOALS TO BE MET BEFORE DISCHARGE:  1. Orthostatic performed: No    2. Diagnostic testing complete & at baseline neurologic testing: No    3. Cleared by consultants (if involved): No    4. Interpretation of cardiac rhythm per telemetry tech: SR HR 70's    5. Tolerating adequate PO diet and medications: Yes    6. Return to near baseline physical activity or neurologic status: Yes    Discharge Planner Nurse   Safe discharge environment identified: Yes  Barriers to discharge: No       Entered by: Anastacio Hernández 04/29/2019     Pt alert and oriented x4. He denies pain. Neuros intact. Independent in room. Saline locked. Tele SR HR 70's. Echo and MRI this AM. Neurology consult.     /75 (BP Location: Right arm)   Pulse 78   Temp 98.2  F (36.8  C) (Oral)   Resp 20   Ht 1.829 m (6')   Wt (!) 201 kg (443 lb 2 oz)   SpO2 98%   BMI 60.10 kg/m          Please review provider order for any additional goals.   Nurse to notify provider when observation goals have been met and patient is ready for discharge.

## 2019-04-29 NOTE — PROGRESS NOTES
Noted brain MRI not yet pending or done. If patient does not want to stay for this test, ok to have open air brain mri done at a [facility] such [facility], however I discussed with patient and wife that these studies can be inferior quality to closed brain MRI and they wanted to have test done here before leaving. And if done as outpatient needs to be done within 7 days from now so as not to miss any diffusion lesion in acute phase. Please page stroke neurology on call for any further follow up/recs after 5pm.    Hannah Delaney PA-C  Neurology  04/29/2019 4:38 PM  Pager: [phone]

## 2019-04-29 NOTE — ED NOTES
"[facility]  ED Nurse Handoff Report    Jareth Bryant is a 33 year old male   ED Chief complaint: One-sided Weakness  . ED Diagnosis:   Final diagnoses:   TIA (transient ischemic attack)     Allergies:   Allergies   Allergen Reactions     Penicillins      Sulfa Drugs        Code Status: Full Code  Activity level - Baseline/Home:  Independent. Activity Level - Current:   Independent. Lift room needed: No. Bariatric: No   Needed: No   Isolation: No. Infection: Not Applicable      Vital Signs:   Vitals:    04/28/19 1956 04/28/19 2000 04/28/19 2015 04/28/19 2025   BP:  180/89 (!) 130/92    Pulse:  99 109    Resp:       Temp:    98.3  F (36.8  C)   TempSrc:    Oral   SpO2: 99%      Weight:           Cardiac Rhythm: NSR with occasional PVC    Pain level:  0  Patient confused: No. Patient Falls Risk: Yes.   Elimination Status: Has voided   Patient Report - Initial Complaint: Extremity weakness. Focused Assessment: Jareth Bryant is a 33 year old otherwise healthy male who presents with his spouse for evaluation of one sided weakness that began three hours prior to arrival. Of note, patient has not had a physical in over 10 years and has never been tested for diabetes, high cholesterol or hypertension, noting \"the only thing I know is I'm overweight\". Patient reports that he initially felt a \"heaviness\" in his right arm, going from the shoulder into his fingertips. He denies any prior incident of this. Patient reported that while driving to his in-laws, as he was getting out of the car, his right leg \"gave out\". Concerned, spouse prompted patient to present.   Here in the ED, patient endorses continued heaviness in his arm with associated numbness and tingling as though \"someone put a weight on my arm\". Patient endorsed that this sensation is different than the left arm. He also notes some lightheadedness. He reports that when he arrived here, he no longer had any gait problem like previously. " Of note, patient's father has history of TIAs.   Patient has been ambulatory and neuro checks normal with the exception of right  (slightly less strong) while in the ED.     Tests Performed: EKG, Blood work, CT scan. Abnormal Results:   Labs Ordered and Resulted from Time of ED Arrival Up to the Time of Departure from the ED   BASIC METABOLIC PANEL - Abnormal; Notable for the following components:       Result Value    Glucose 186 (*)     All other components within normal limits   CBC WITH PLATELETS DIFFERENTIAL - Abnormal; Notable for the following components:    WBC 11.3 (*)     All other components within normal limits   GLUCOSE BY METER - Abnormal; Notable for the following components:    Glucose 177 (*)     All other components within normal limits   INR   PARTIAL THROMBOPLASTIN TIME   TROPONIN I     CT Head Perfusion w Contrast   Final Result   IMPRESSION: Negative CT brain perfusion imaging.       PIPE APONTE MD      CT Head w/o Contrast   Final Result   IMPRESSION: Normal CT scan of the head.             PIPE APONTE MD      CTA Head Neck with Contrast    (Results Pending)     .   Treatments provided: Plavix, Aspirin.  Family Comments: Family at bedside.  OBS brochure/video discussed/provided to patient:  Yes  ED Medications:   Medications   iopamidol (ISOVUE-370) solution 500 mL (120 mLs Intravenous Given 4/28/19 1945)   sodium chloride (PF) 0.9% PF flush 100 mL (80 mLs Intravenous Given 4/28/19 1945)   aspirin (ASA) tablet 325 mg (325 mg Oral Given 4/28/19 2014)   clopidogrel (PLAVIX) tablet 300 mg (300 mg Oral Given 4/28/19 2014)     Drips infusing:  No  For the majority of the shift, the patient's behavior Green. Interventions performed were N/A.     Severe Sepsis OR Septic Shock Diagnosis Present: No      ED Nurse Name/Phone Number: Lynette Braun,   9:06 PM    RECEIVING UNIT ED HANDOFF REVIEW    Above ED Nurse Handoff Report was reviewed: Yes  Reviewed by: Marsha Ortiz on April 28, 2019 at  9:21 PM

## 2019-04-29 NOTE — PROGRESS NOTES
"[facility]      Stroke Consult Note    Reason for Consult:  Non-emergent consult request for \"CVA\"    HPI  Jareth Bryant is a 33 year old man who was seen by Dr. Nair last evening as as a stroke code. R arm heaviness lasted 6 hours yesterday.  BP on arrival to the ED was 160/88. Today he feels back to normal.    TIA Evaluation Summarized  MRI and/or Head CT: MRI brain not done due to body habitus  Intracranial Vascular Imaging: CTA head/neck showed No definite abnormalities in the vessels of the head and  neck. Exam slightly limited due to patient's body habitus and timing of bolus contrast injection.   Cervical Carotid and Vertebral Artery Vascular Imaging:CTA head/neck showed No definite abnormalities in the vessels of the head and  neck. Exam slightly limited due to patient's body habitus and timing of bolus contrast injection.   Echocardiogram: EF 55%, RV normal, LV normal, bubble study negative but echo very poor quality. No valve disease  EKG/Telemetry: Sinus rhythm  LDL: 69  A1c: 6.3  Other testing: Not Applicable    ABCD2 Patients Score   Age ? 60 years 1 point 0   Blood Pressure     -SBP ? 140 or DBP ?  90    1 point 1   Clinical Features    -Unilateral weakness   -Speech disturbance w/o           weakness    -Other    2 points  1 point    0 points 2   Duration of symptoms    ?60 minutes    10-59 minutes    <10 minutes   2 points  1 point  0 points 2   Diabetes  1 point 0   Patient s ABCD2 Score (0-7) = 5           Impression  TIA, slightly atypical due to long duration (6 hrs) but otherwise symptoms are consistent without an alternative etiology at this time.    Recommendations  - Pt has agreed to try MRI again. I talked to MRI techs and we will do critical sequences first, DWI then ADC then FLAIR and then which ever ones he can tolerate following those  - 30 day cardiac event monitor upon discharge  - Continue Plavix 75 mg and aspirin 325mg daily together for 21 days; then aspirin " 325mg alone  - Goal normotension  - If MRI does not show an obvious alternative cause such as brain mass, patient will need antiphospholipid, anticardiolipin, lupus anticoagulant, and beta-2glycoprotein testing for hypercoagulable state with consideration of entire hypercoagulable panel          Patient Follow-up    - in the next 1 week(s) with PCP  - in 4-6 weeks with local neurologist      Please contact the Stroke Service with any questions.    Hannah Delaney PA-C  Neurology  04/29/2019 2:48 PM  Pager: [phone]    Text Page ([phone])  __________________________________________________    No past medical history on file.    No past surgical history on file.    Medications   Home Meds  Prior to Admission medications    Medication Sig Start Date End Date Taking? Authorizing Provider   escitalopram (LEXAPRO) 20 MG tablet Take 20 mg by mouth At Bedtime   Yes Unknown, Entered By History   lamoTRIgine (LAMICTAL) 200 MG tablet Take 200 mg by mouth At Bedtime   Yes Unknown, Entered By History       Scheduled meds    aspirin  325 mg Oral Daily    Or     aspirin  300 mg Rectal Daily     atorvastatin  40 mg Oral or NG Tube Daily at 8 pm     clopidogrel  75 mg Oral or NG Tube Daily     escitalopram  20 mg Oral At Bedtime     lamoTRIgine  200 mg Oral At Bedtime     sodium chloride (PF)  3 mL Intracatheter Q8H       Infusion meds      PRN meds  acetaminophen, acetaminophen, labetalol, lidocaine 4%, lidocaine (buffered or not buffered), melatonin, naloxone, ondansetron **OR** ondansetron, polyethylene glycol, senna-docusate **OR** senna-docusate, sodium chloride (PF)      Allergies   Allergies   Allergen Reactions     Penicillins      Sulfa Drugs        Family History       Social History      Social History     Tobacco Use     Smoking status: Not on file   Substance Use Topics     Alcohol use: Not on file     Drug use: Not on file         ROS  The 5 point Review of Systems is negative other than noted in the HPI or here.      PHYSICAL EXAMINATION  Temp:  [98.1  F (36.7  C)-98.5  F (36.9  C)] 98.2  F (36.8  C)  Pulse:  [] 78  Heart Rate:  [67-83] 72  Resp:  [13-20] 20  BP: (129-180)/(75-92) 149/75  SpO2:  [97 %-99 %] 98 %      Stroke Scales    NIHSS  Interval and Comments baseline   1a. Level of Consciousness 0-->Alert, keenly responsive   1b. LOC Questions 0-->Answers both questions correctly   1c. LOC Commands 0-->Performs both tasks correctly   2.   Best Gaze 0-->Normal   3.   Visual 0-->No visual loss   4.   Facial Palsy 0-->Normal symmetrical movements   5a. Motor Arm, Left 0-->No drift, limb holds 90 (or 45) degrees for full 10 secs   5b. Motor Arm, Right 0-->No drift, limb holds 90 (or 45) degrees for full 10 secs   6a. Motor Leg, Left 0-->No drift, leg holds 30 degree position for full 5 secs   6b. Motor Leg, right 0-->No drift, leg holds 30 degree position for full 5 secs   7.   Limb Ataxia 0-->Absent   8.   Sensory 0-->Normal, no sensory loss   9.   Best Language 0-->No aphasia, normal   10. Dysarthria 0-->Normal   11. Extinction and Inattention  0-->No abnormality   Total 0       Labs/Imaging  Labs and imaging were reviewed and used in developing plan; pertinent results included.  Data   CBC  WBC (10e9/L)   Date Value   04/29/2019 8.9   04/28/2019 11.3 (H)    RBC Count (10e12/L)   Date Value   04/29/2019 5.04   04/28/2019 5.00    Hemoglobin (g/dL)   Date Value   04/29/2019 13.4   04/28/2019 13.6      Hematocrit (%)   Date Value   04/29/2019 42.4   04/28/2019 42.4    Platelet Count (10e9/L)   Date Value   04/29/2019 330   04/28/2019 352         BMP  Sodium (mmol/L)   Date Value   04/29/2019 137   04/28/2019 137    Potassium (mmol/L)   Date Value   04/29/2019 4.3   04/28/2019 4.0    Chloride (mmol/L)   Date Value   04/29/2019 107   04/28/2019 103      Carbon Dioxide (mmol/L)   Date Value   04/29/2019 26   04/28/2019 29    Glucose (mg/dL)   Date Value   04/29/2019 105 (H)   04/28/2019 186 (H)    Urea Nitrogen (mg/dL)    Date Value   04/29/2019 13   04/28/2019 17      Creatinine (mg/dL)   Date Value   04/29/2019 0.66   04/28/2019 0.70    Calcium (mg/dL)   Date Value   04/29/2019 8.4 (L)   04/28/2019 8.5         INR Troponin I A1C   INR (no units)   Date Value   04/28/2019 1.00    Troponin I ES (ug/L)   Date Value   04/28/2019 <0.015    Hemoglobin A1C (%)   Date Value   04/28/2019 6.3 (H)        Liver Panel  No results found for: PROTTOTAL No results found for: ALBUMIN No results found for: BILITOTAL   No results found for: ALKPHOS No results found for: AST No results found for: ALT   No results found for: BILIDIRECT       Lipid Profile  Cholesterol (mg/dL)   Date Value   04/28/2019 157    HDL Cholesterol (mg/dL)   Date Value   04/28/2019 35 (L)    LDL Cholesterol Calculated (mg/dL)   Date Value   04/28/2019 69      Triglycerides (mg/dL)   Date Value   04/28/2019 257 (H)    No results found for: CHOLHDRADHA           I have personally spent a total of 40 minutes providing care and consulting with this patient's medical providers today, with more than 50% of this time spent in consultation, coordination of care, and discussion with the patient and/or family regarding diagnostic results, prognosis, symptom management, risks and benefits of management options, and development of plan of care.     Telestroke Service Details  (for non-emergent stroke consult with tele)  Tele service began 04/29/19   1415   Tele service ended 04/29/19   1442   Type of service telemedicine diagnostic assessment of acute neurological changes   Reason telemedicine is appropriate patient requires assessment with a specialist for diagnosis and treatment of neurological symptoms   Mode of transmission secure interactive audio and video communication per Evelyn   Originating site (patient location) [facility] [place]    Distant site (provider location) [facility]

## 2019-04-29 NOTE — H&P
History and Physical     Jareth Bryant MRN# [id number]   YOB: 1985 Age: 33 year old      Date of Admission:  4/28/2019    Primary care provider: No primary care provider on file.          Assessment and Plan:   Jareth Bryant is a 33 year old male with no prior medical history but does not see a doctor regularly who presents with right arm and right leg weakness now resolved returning for TIA.    Patient was discussed with Dr. Guerra, who was provider in ED. Chart review of ED work up was reviewed as well as chart review of Care Everywhere, previous visits and admissions.     1.  Right arm and leg weakness/numbness likely related to TIA  Patient describes the onset of right arm numbness/tingling from armpit to fingertips that progressed to arm weakness and then to leg weakness at approximately 6 PM this evening.  It was completely resolved by the time I saw him at 9 PM.  CT head is negative and CTA head/neck is negative but limited due to body habitus and contrast timing.  Stroke neurology saw patient in the ED and recommended Plavix loading and full dose aspirin with further stroke work-up.  Patient reports his father having multiple strokes and what sounds like arrhythmia requiring cardioversion or ablation.  Patient has morbidly obese and does not see a provider regularly so may have undiagnosed risk factors.  -MRI brain  -Echo bubble  -Monitor on telemetry  -Plavix 75 mg daily  -Full dose aspirin  -Added lipid panel and A1c, will monitor blood pressures  -Lipitor 40 mg daily  -Recommend weight loss at discharge    2. Bipolar/Anxiety  -Continue PTA Lexapro and Escitalopram      Social: No concerns  Code: Discussed with patient and they have chosen Full code  VTE prophylaxis: PCDs  Disposition: Observation                    Chief Complaint:   Right-sided arm and leg weakness         History of Present Illness:   Jareth Bryant is a 33 year old male who presents with right-sided arm and leg  weakness.  He states he was eating dinner at approximately 6 PM when he noticed numbness and tingling of his right arm starting in his armpit and ending in his fingers.  This progressed to heaviness in his arm with difficulty using his arm.  He then drove to his mother-in-law's house approximately 15 minutes away and when he stepped out of the car his right leg gave out and he fell to the ground.  He had to be helped up and sat in a chair but was able to ambulate about 15 minutes later.  Over the course of the last 2 to 3 hours his arm symptoms have resolved.  Per his wife he never had any mumbling speech or drooping face.  This is never happened to him before but he does state his father has had multiple strokes.  He has morbidly obese weighing 440 pounds but has not seen a provider for a physical in 20 years.  He is a recovering alcoholic with his last drink in 2013 and does not smoke cigarettes.  He takes medication for his bipolar disorder prescribed by his psychiatrist.             Past Medical History:   Bipolar disorder            Past Surgical History:   Hx of oral surgery            Social History:   Previous drinker          Family History:   Family history reviewed and is non contributory         Allergies:      Allergies   Allergen Reactions     Penicillins      Sulfa Drugs                Medications:     Prior to Admission medications    Medication Sig Last Dose Taking? Auth Provider   escitalopram (LEXAPRO) 20 MG tablet Take 20 mg by mouth At Bedtime 4/27/2019 at hs Yes Unknown, Entered By History   lamoTRIgine (LAMICTAL) 200 MG tablet Take 200 mg by mouth At Bedtime 4/27/2019 at hs Yes Unknown, Entered By History              Review of Systems:   A Comprehensive greater than 10 system review of systems was carried out.  Pertinent positives and negatives are noted above.  Otherwise negative for contributory information.            Physical Exam:   Blood pressure 163/84, pulse 83, temperature 98.3  F  (36.8  C), temperature source Oral, resp. rate 13, weight (!) 202 kg (445 lb 5.3 oz), SpO2 99 %.  Exam:  GENERAL:  Comfortable, morbidly obese male  PSYCH: pleasant, oriented, No acute distress.  HEENT:  PERRLA. Normal conjunctiva, normal hearing, nasal mucosa and Oropharynx are normal.  NECK:  Supple, no neck vein distention, adenopathy or bruits, normal thyroid.  HEART:  Normal S1, S2 with no murmur, no pericardial rub, gallops or S3 or S4.  LUNGS:  Clear to auscultation, normal Respiratory effort. No wheezing, rales or ronchi.  ABDOMEN:  Soft, no hepatosplenomegaly, normal bowel sounds. Non-tender, non distended.   EXTREMITIES:  No pedal edema, +2 pulses bilateral and equal.  SKIN:  Dry to touch, No rash, wound or ulcerations.  NEUROLOGIC:  CN 2-12 intact, BL 5/5 symmetric upper and lower extremity strength, sensation is intact with no focal deficits.               Data:     Recent Labs   Lab 04/28/19 1929   WBC 11.3*   HGB 13.6   HCT 42.4   MCV 85        Recent Labs   Lab 04/28/19 1929      POTASSIUM 4.0   CHLORIDE 103   CO2 29   ANIONGAP 5   *   BUN 17   CR 0.70   GFRESTIMATED >90   GFRESTBLACK >90   THERESE 8.5     Recent Labs   Lab 04/28/19 1929   TROPI <0.015         Recent Results (from the past 24 hour(s))   CT Head w/o Contrast    Narrative    CT SCAN OF THE HEAD WITHOUT CONTRAST   4/28/2019 7:41 PM     HISTORY: Weakness. Code Stroke. Right leg gave out.    TECHNIQUE:  Axial images of the head and coronal reformations without  IV contrast material.  Radiation dose for this scan was reduced using  automated exposure control, adjustment of the mA and/or kV according  to patient size, or iterative reconstruction technique.    COMPARISON: None.    FINDINGS:  The ventricles are normal in size, shape and configuration.   The brain parenchyma and subarachnoid spaces are normal. There is no  evidence of intracranial hemorrhage, mass, acute infarct or anomaly.     The visualized portions of the  sinuses and mastoids appear normal.  There is no evidence of trauma.      Impression    IMPRESSION: Normal CT scan of the head.         PIPE APONTE MD   CTA Head Neck with Contrast    Narrative    CTA  HEAD NECK WITH CONTRAST 4/28/2019 7:51 PM     HISTORY:  Code Stroke. Weakness in right arm and right leg.    TECHNIQUE: Axial images were obtained through the head and neck with  intravenous contrast. Exam to be repeated due to poor timing of the  bolus on the first study. 140 cc of Isovue 370 was given. Multiplanar  reconstructions were performed. Angiographic images were also  obtained. Radiation dose for this scan was reduced using automated  exposure control, adjustment of the mA and/or kV according to patient  size, or iterative reconstruction technique. Carotid stenoses were  evaluated by comparing the caliber of the proximal internal carotid  artery to the caliber of the distal internal carotid artery.    FINDINGS: Images through both the head and neck are somewhat  suboptimal due to body habitus and timing of bolus.    Brachiocephalic vessels: Normal.    Right carotid system: Normal.    Left carotid system: Normal.    Right vertebral artery: Normal.    Left vertebral artery: Normal.    [place]: Normal.    Other findings: None.      Impression    IMPRESSION: No definite abnormalities in the vessels of the head and  neck. Exam slightly limited due to patient's body habitus and timing  of bolus contrast injection.     PIPE APONTE MD   CT Head Perfusion w Contrast    Narrative    CT HEAD PERFUSION WITH CONTRAST 4/28/2019 8:19 PM     HISTORY:  Right leg weakness. Code Stroke.    TECHNIQUE: Axial images were obtained through the brain with  intravenous contrast for CT perfusion imaging. Radiation dose for this  scan was reduced using automated exposure control, adjustment of the  mA and/or kV according to patient size, or iterative reconstruction  technique.    FINDINGS: There is some artifact in the  inferior left cerebellum.  Perfusion images otherwise show normal perfusion on cerebral blood  flow, cerebral blood volume, mean transit time maps. No convincing  evidence for ischemia or infarct.      Impression    IMPRESSION: Negative CT brain perfusion imaging.     MD Melisa BRASWELL PA-C

## 2019-04-29 NOTE — PHARMACY-ADMISSION MEDICATION HISTORY
Admission medication history interview status for this patient is complete. See [facility] admission navigator for allergy information, prior to admission medications and immunization status.     Medication history interview source(s):Patient and Family  Medication history resources (including written lists, pill bottles, clinic record):None  Primary pharmacy:[facility]    Changes made to PTA medication list:  Added: lamictal and lexapro  Deleted: none  Changed: none    Actions taken by pharmacist (provider contacted, etc):None     Additional medication history information:None    Medication reconciliation/reorder completed by provider prior to medication history? No    For patients on insulin therapy: No    Lantus/levemir/NPH/Mix 70/30 dose: _____ in AM/PM or twice daily   Sliding scale Novolog Y/N   If Yes, do you have a baseline novolog pre-meal dose: ______units with meals   Patients eat three meals a day: Y/N   Any Barriers to therapy: cost of medications/comfortable with giving injections (if applicable)/ comfortable and confident with current diabetes regimen     Prior to Admission medications    Medication Sig Last Dose Taking? Auth Provider   escitalopram (LEXAPRO) 20 MG tablet Take 20 mg by mouth At Bedtime 4/27/2019 at hs Yes Unknown, Entered By History   lamoTRIgine (LAMICTAL) 200 MG tablet Take 200 mg by mouth At Bedtime 4/27/2019 at hs Yes Unknown, Entered By History

## 2019-04-30 LAB
CARDIOLIPIN ANTIBODY IGG: <1.6 GPL-U/ML (ref 0–19.9)
CARDIOLIPIN ANTIBODY IGM: 0.4 MPL-U/ML (ref 0–19.9)

## 2019-04-30 NOTE — PLAN OF CARE
Patient's After Visit Summary was reviewed with patient and/or spouse.   Patient verbalized understanding of After Visit Summary, recommended follow up and was given an opportunity to ask questions.   Discharge medications sent home with patient/family: YES, plavix and aspirin  Discharged with spouse    Time out: 6720

## 2019-04-30 NOTE — DISCHARGE SUMMARY
[facility] Observation Unit Discharge Summary          Jareth Bryant MRN# [id number]   Age: 33 year old YOB: 1985     Date of Admission:  4/28/2019  Date of Discharge::  4/29/2019  Admitting Physician:  Chava Kilpatrick MD  Discharge Physician:  Carmel Strong PA-C  Primary Physician: No Ref-Primary, Physician     Primary Discharge Diagnoses:   TIA     Secondary Discharge Diagnoses:   Hypertriglyceridemia  Pre-DM Type 2  Obesity  Bipolar Disorder     Hospital Course:   For detail history, please refer to H & P from 4/28/2019. In brief, this is a 33 year old male with a history of Obesity, Bipolar Disorder who presented to the ED on 4/28/19 with acute onset of RUE numbness and weakness and RLE weakness.  Patient remained asymptomatic while hospitalized.     Right Arm and Leg weakness/numbness likely related to TIA - Patient described the onset of right arm numbness/tingling from armpit to fingertips that progressed to arm weakness and then to leg weakness on 4/28/19.  Symptoms resolved at the time of admission and he remained asymptomatic while hospitalized.    Work up included:  Pt hemodynamically stable.  CT head and neck negative.  Echo bubble wnl.  EKG with NSR.  Troponin negative.  Telemetry with NSR.  Lipid panel with elevated triglycerides 257 o/w wnl.  MRI of the brain was unremarkable.  Stroke Neurology was consulted and patient was instructed to continue Plavix 75 mg and Aspirin 325mg daily together for 21 days; then aspirin 325mg alone.  A 30 day event monitor was placed.  Hypercoagulable labs were sent and pending at the time of discharge.  He will follow up with his PCP within one week and follow up with Neurology in 4-6 weeks.       Pre-Diabetes Mellitus Type 2 - hgb A1C 6.3.  Patient was provided diabetes education materials and will need close follow up monitoring with PCP.     Procedures/Imaging:     Results for orders placed or performed during the hospital  encounter of 04/28/19   CT Head Perfusion w Contrast    Narrative    CT HEAD PERFUSION WITH CONTRAST 4/28/2019 8:19 PM     HISTORY:  Right leg weakness. Code Stroke.    TECHNIQUE: Axial images were obtained through the brain with  intravenous contrast for CT perfusion imaging. Radiation dose for this  scan was reduced using automated exposure control, adjustment of the  mA and/or kV according to patient size, or iterative reconstruction  technique.    FINDINGS: There is some artifact in the inferior left cerebellum.  Perfusion images otherwise show normal perfusion on cerebral blood  flow, cerebral blood volume, mean transit time maps. No convincing  evidence for ischemia or infarct.      Impression    IMPRESSION: Negative CT brain perfusion imaging.     PIPE APONTE MD   CT Head w/o Contrast    Narrative    CT SCAN OF THE HEAD WITHOUT CONTRAST   4/28/2019 7:41 PM     HISTORY: Weakness. Code Stroke. Right leg gave out.    TECHNIQUE:  Axial images of the head and coronal reformations without  IV contrast material.  Radiation dose for this scan was reduced using  automated exposure control, adjustment of the mA and/or kV according  to patient size, or iterative reconstruction technique.    COMPARISON: None.    FINDINGS:  The ventricles are normal in size, shape and configuration.   The brain parenchyma and subarachnoid spaces are normal. There is no  evidence of intracranial hemorrhage, mass, acute infarct or anomaly.     The visualized portions of the sinuses and mastoids appear normal.  There is no evidence of trauma.      Impression    IMPRESSION: Normal CT scan of the head.         PIPE APONTE MD   CTA Head Neck with Contrast    Narrative    CTA  HEAD NECK WITH CONTRAST 4/28/2019 7:51 PM     HISTORY:  Code Stroke. Weakness in right arm and right leg.    TECHNIQUE: Axial images were obtained through the head and neck with  intravenous contrast. Exam to be repeated due to poor timing of the  bolus on the first  study. 140 cc of Isovue 370 was given. Multiplanar  reconstructions were performed. Angiographic images were also  obtained. Radiation dose for this scan was reduced using automated  exposure control, adjustment of the mA and/or kV according to patient  size, or iterative reconstruction technique. Carotid stenoses were  evaluated by comparing the caliber of the proximal internal carotid  artery to the caliber of the distal internal carotid artery.    FINDINGS: Images through both the head and neck are somewhat  suboptimal due to body habitus and timing of bolus.    Brachiocephalic vessels: Normal.    Right carotid system: Normal.    Left carotid system: Normal.    Right vertebral artery: Normal.    Left vertebral artery: Normal.    [place]: Normal.    Other findings: None.      Impression    IMPRESSION: No definite abnormalities in the vessels of the head and  neck. Exam slightly limited due to patient's body habitus and timing  of bolus contrast injection.     PIPE APONTE MD   MR Brain w/o Contrast    Narrative    MRI BRAIN WITHOUT CONTRAST  4/29/2019 9:54 PM    HISTORY:  Had right arm heaviness for 6 hours.      TECHNIQUE:  Multiplanar, multisequence MRI of the brain without  gadolinium IV contrast material.      COMPARISON:  Head CT 4/28/2019    FINDINGS:   The cerebral hemispheres, brainstem, and cerebellum demonstrate normal  morphology and attenuation. No evidence of acute ischemia, hemorrhage,  mass, mass effect, or hydrocephalus. No abnormal diffusion restriction  or susceptibility related signal loss is identified. The visualized  calvarium, skull base, paranasal sinuses, and extracranial soft  tissues are unremarkable.       Impression    IMPRESSION:  Unremarkable MRI of the head without contrast. No evidence of ischemia  or hemorrhage.    YOSSI VELIZ MD     Consultations:   Stroke Neurology    Code Status:   Full    Allergies:     Allergies   Allergen Reactions     Penicillins      Sulfa  Drugs         Subjective:   Patient reports he feels back to his baseline.  Denies any numbness, weakness, chest pain, shortness of breath.     Physical Exam:   Blood pressure 137/84, pulse 78, temperature 97.9  F (36.6  C), temperature source Oral, resp. rate 18, height 1.829 m (6'), weight (!) 201 kg (443 lb 2 oz), SpO2 98 %.  General: Alert, interactive, NAD, sitting up in bed with family at the bedside, pleasant and cooperative.  HEENT: AT/NC, sclera anicteric, PERRL, EOMI  Resp: clear to auscultation bilaterally, no crackles or wheezes  Cardiac: regular rate and rhythm, no murmur  Abdomen: Soft, nontender, nondistended. +BS.  No HSM or masses, no rebound or guarding.  Extremities: No LE edema  Skin: Warm and dry, no jaundice or rash  Neuro: Alert & oriented x 3, Cns 2-12 intact, moves all extremities equally     Discharge Medicatios:        Discharge Medication List as of 4/29/2019 10:38 PM      START taking these medications    Details   aspirin (ASA) 325 MG EC tablet Take 1 tablet (325 mg) by mouth daily, Disp-30 tablet, R-0, E-Prescribe      atorvastatin (LIPITOR) 40 MG tablet 1 tablet (40 mg) by Oral or NG Tube route daily, Disp-30 tablet, R-0, Local Print      clopidogrel (PLAVIX) 75 MG tablet 1 tablet (75 mg) by Oral or NG Tube route daily, Disp-20 tablet, R-0, E-Prescribe         CONTINUE these medications which have NOT CHANGED    Details   escitalopram (LEXAPRO) 20 MG tablet Take 20 mg by mouth At Bedtime, Historical      lamoTRIgine (LAMICTAL) 200 MG tablet Take 200 mg by mouth At Bedtime, Historical             Instructions Given to Patient as Discharge:     Discharge Procedure Orders   NEUROLOGY ADULT REFERRAL   Referral Priority: Routine Referral Type: Consultation   Number of Visits Requested: 1     Reason for your hospital stay   Order Comments: You were hospitalized due to arm and leg weakness.  Neurology was consulted.     Follow-up and recommended labs and tests    Order Comments: - please  wear the 30 day cardiac event monitor and follow up with your PCP for results.    - please continue Plavix 75 mg and aspirin 325mg daily together for 21 days; then aspirin 325mg alone  - please follow up with your PCP within one week for a hypercoagulable work up.   - your triglycerides were elevated.  Please follow a low cholesterol diet and follow up with PCP   - your hemoglobin A1C was elevated in the pre-diabetes range.  Please start a diabetic diet and follow up with your PCP for ongoing monitoring.   - please follow up in 4-6 weeks with local neurologist  - please follow up with your PCP within one week.     Activity   Order Comments: Your activity upon discharge: activity as tolerated     Order Specific Question Answer Comments   Is discharge order? Yes      When to contact your care team   Order Comments: Notify for fever >101.5; black or bloody stools; severe, persistent, or worsening nausea, vomiting, abdominal pain or distention, diarrhea, constipation; chest pain, difficulty breathing, swelling; dizziness, weakness, lightheadedness, fainting.  Proceed to the nearest emergency room for any urgent concerns.     Diet   Order Comments: Follow this diet upon discharge: diabetic, low cholesterol     Order Specific Question Answer Comments   Is discharge order? Yes        Pending Tests at Discharge:   hypercoagulable labs    Discharge Disposition:   Discharged to home     Carmel Strong MS, PA-C  Hospitalist Service  Pager [phone]    >30 minutes was spent in discharge planning, care coordination, physical examination and medication reconciliation on the date of discharge, 4/29/2019

## 2019-04-30 NOTE — PLAN OF CARE
Vitals are Temp: 97.9  F (36.6  C) Temp src: Oral BP: 137/84   Heart Rate: 74 Resp: 18 SpO2: 98 %.  Patient is Alert and Oriented x4. They are independent with Activity.  Pt is a Regular diet.  They are denying pain.  Patient has no IV access. Pt neuros intact. Pt tele Sr, HR 80s. Awaiting MRI results. Possible discharge if MRI results are negative.

## 2019-05-01 LAB
B2 GLYCOPROT1 IGG SERPL IA-ACNC: 0.9 U/ML
B2 GLYCOPROT1 IGM SERPL IA-ACNC: <0.9 U/ML

## 2019-05-02 LAB — LA PPP-IMP: NEGATIVE

## 2024-09-12 ENCOUNTER — TRANSCRIBE ORDERS (OUTPATIENT)
Dept: OTHER | Age: 39
End: 2024-09-12

## 2024-09-12 DIAGNOSIS — R06.83 LOUD SNORING: ICD-10-CM

## 2024-09-12 DIAGNOSIS — E11.9 TYPE 2 DIABETES MELLITUS WITHOUT COMPLICATION, WITHOUT LONG-TERM CURRENT USE OF INSULIN (H): ICD-10-CM

## 2024-09-12 DIAGNOSIS — I10 PRIMARY HYPERTENSION: ICD-10-CM
